# Patient Record
Sex: FEMALE | Race: WHITE | Employment: UNEMPLOYED | URBAN - METROPOLITAN AREA
[De-identification: names, ages, dates, MRNs, and addresses within clinical notes are randomized per-mention and may not be internally consistent; named-entity substitution may affect disease eponyms.]

---
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
## 2025-05-22 ENCOUNTER — OFFICE VISIT (OUTPATIENT)
Facility: CLINIC | Age: 20
End: 2025-05-22
Payer: COMMERCIAL

## 2025-05-22 VITALS
DIASTOLIC BLOOD PRESSURE: 69 MMHG | SYSTOLIC BLOOD PRESSURE: 102 MMHG | BODY MASS INDEX: 22.82 KG/M2 | HEIGHT: 66 IN | HEART RATE: 99 BPM | WEIGHT: 142 LBS | OXYGEN SATURATION: 96 %

## 2025-05-22 DIAGNOSIS — Z83.438 FAMILY HISTORY OF HYPERLIPIDEMIA: ICD-10-CM

## 2025-05-22 DIAGNOSIS — Z11.3 SCREEN FOR STD (SEXUALLY TRANSMITTED DISEASE): ICD-10-CM

## 2025-05-22 DIAGNOSIS — Z11.59 NEED FOR HEPATITIS C SCREENING TEST: ICD-10-CM

## 2025-05-22 DIAGNOSIS — R05.2 SUBACUTE COUGH: ICD-10-CM

## 2025-05-22 DIAGNOSIS — Z00.00 ROUTINE HISTORY AND PHYSICAL EXAMINATION OF ADULT: Primary | ICD-10-CM

## 2025-05-22 PROCEDURE — 99385 PREV VISIT NEW AGE 18-39: CPT | Performed by: INTERNAL MEDICINE

## 2025-05-22 ASSESSMENT — PATIENT HEALTH QUESTIONNAIRE - PHQ9
2. FEELING DOWN, DEPRESSED OR HOPELESS: NOT AT ALL
SUM OF ALL RESPONSES TO PHQ QUESTIONS 1-9: 0
1. LITTLE INTEREST OR PLEASURE IN DOING THINGS: NOT AT ALL

## 2025-05-22 NOTE — PATIENT INSTRUCTIONS
Try using Nasonex, Nasacort or Flonase (generic versions are okay) nasal steroid once daily--2 sprays in each nostril once daily for 2-4 weeks.

## 2025-05-22 NOTE — PROGRESS NOTES
A/P:    1. Routine history and physical examination of adult  She is seen in gynecology for contraceptive management.  Will obtain vaccine records from [facility].  Regular exercise recommended.  She will follow-up for fasting lipids soon.  - Lipid Panel; Future    2. Family history of hyperlipidemia  - Lipid Panel; Future    3. Need for hepatitis C screening test  - Hepatitis C Antibody; Future    4. Screen for STD (sexually transmitted disease)  - Ct, Ng, Mycoplasmas ALYSSA, Urine; Future  - HIV 1/2 Ag/Ab, 4TH Generation,W Rflx Confirm; Future  - Hepatitis C Antibody; Future  - T. pallidum Ab; Future    5. Subacute cough  Likely post URI cough, somewhat improved.  She also has symptoms of eustachian tube dysfunction.  Recommended she use a nasal steroid spray, 2 sprays in each nostril once daily for 2 to 4 weeks.       Follow up:  1 year for CPE        An electronic signature was used to authenticate this note.    --Sharri Collazo MD         HPI: Steffanie Palacios is here for a wellness visit:         Health maintenance: She has been doing well, has few complaints today. She is not currently sexually active, but has been sexually active with male partners in the past.  She has never had STD screening.  Pap smear has not been done yet, but she sees gynecology for contraceptive management.    She is not fasting for labs today.       There is no problem list on file for this patient.       History reviewed. No pertinent past medical history.    Past Surgical History:   Procedure Laterality Date    TONSILLECTOMY      WISDOM TOOTH EXTRACTION  2020       History reviewed. No pertinent family history.    Social History     Tobacco Use    Smoking status: Never    Smokeless tobacco: Never   Vaping Use    Vaping status: Never Used   Substance Use Topics    Alcohol use: Yes occasional    Drug use: Never         ROS:

## 2025-06-27 ENCOUNTER — TELEPHONE (OUTPATIENT)
Age: 20
End: 2025-06-27

## 2025-06-27 NOTE — TELEPHONE ENCOUNTER
PT name and  verified    18 yo last ov and seen 22    Mother-Alison calling, david signed 22, was informed the PT needs to call as the form , needs to be renewed yearly.  Shira asks if she needs an appt for her IUD to be changed, RN advised that the PT call to collect that information.    Shira verbalizes understanding.

## 2025-07-03 DIAGNOSIS — Z11.59 NEED FOR HEPATITIS C SCREENING TEST: ICD-10-CM

## 2025-07-03 DIAGNOSIS — Z00.00 ROUTINE HISTORY AND PHYSICAL EXAMINATION OF ADULT: ICD-10-CM

## 2025-07-03 DIAGNOSIS — Z11.3 SCREEN FOR STD (SEXUALLY TRANSMITTED DISEASE): ICD-10-CM

## 2025-07-03 DIAGNOSIS — Z83.438 FAMILY HISTORY OF HYPERLIPIDEMIA: ICD-10-CM

## 2025-07-04 ENCOUNTER — RESULTS FOLLOW-UP (OUTPATIENT)
Facility: CLINIC | Age: 20
End: 2025-07-04

## 2025-07-04 LAB
CHOLEST SERPL-MCNC: 158 MG/DL
HCV AB SER IA-ACNC: 0.06 INDEX
HCV AB SERPL QL IA: NONREACTIVE
HDLC SERPL-MCNC: 53 MG/DL
HDLC SERPL: 3 (ref 0–5)
HIV 1+2 AB+HIV1 P24 AG SERPL QL IA: NONREACTIVE
HIV 1/2 RESULT COMMENT: NORMAL
LDLC SERPL CALC-MCNC: 93 MG/DL (ref 0–100)
TRIGL SERPL-MCNC: 60 MG/DL
VLDLC SERPL CALC-MCNC: 12 MG/DL

## 2025-07-06 LAB — T PALLIDUM AB SER QL IA: NON REACTIVE

## 2025-07-07 LAB
C TRACH RRNA UR QL NAA+PROBE: NEGATIVE
M GENITALIUM DNA SPEC QL NAA+PROBE: NEGATIVE
M HOMINIS DNA SPEC QL NAA+PROBE: NEGATIVE
N GONORRHOEA RRNA UR QL NAA+PROBE: NEGATIVE
UREAPLASMA DNA SPEC QL NAA+PROBE: POSITIVE

## 2025-08-11 ENCOUNTER — OFFICE VISIT (OUTPATIENT)
Age: 20
End: 2025-08-11
Payer: COMMERCIAL

## 2025-08-11 VITALS
RESPIRATION RATE: 16 BRPM | DIASTOLIC BLOOD PRESSURE: 64 MMHG | BODY MASS INDEX: 22.76 KG/M2 | OXYGEN SATURATION: 98 % | SYSTOLIC BLOOD PRESSURE: 100 MMHG | WEIGHT: 141 LBS | HEART RATE: 80 BPM

## 2025-08-11 DIAGNOSIS — Z00.00 WELL WOMAN EXAM WITHOUT GYNECOLOGICAL EXAM: ICD-10-CM

## 2025-08-11 DIAGNOSIS — Z11.3 SCREEN FOR STD (SEXUALLY TRANSMITTED DISEASE): Primary | ICD-10-CM

## 2025-08-11 PROCEDURE — 99395 PREV VISIT EST AGE 18-39: CPT | Performed by: OBSTETRICS & GYNECOLOGY

## 2025-08-11 RX ORDER — NORGESTIMATE AND ETHINYL ESTRADIOL 0.25-0.035
1 KIT ORAL DAILY
Qty: 3 PACKET | Refills: 4 | Status: SHIPPED | OUTPATIENT
Start: 2025-08-11 | End: 2025-08-11

## 2025-08-11 SDOH — ECONOMIC STABILITY: FOOD INSECURITY: WITHIN THE PAST 12 MONTHS, YOU WORRIED THAT YOUR FOOD WOULD RUN OUT BEFORE YOU GOT MONEY TO BUY MORE.: NEVER TRUE

## 2025-08-11 SDOH — ECONOMIC STABILITY: FOOD INSECURITY: WITHIN THE PAST 12 MONTHS, THE FOOD YOU BOUGHT JUST DIDN'T LAST AND YOU DIDN'T HAVE MONEY TO GET MORE.: NEVER TRUE
